# Patient Record
Sex: FEMALE | Race: WHITE | NOT HISPANIC OR LATINO | Employment: OTHER | URBAN - METROPOLITAN AREA
[De-identification: names, ages, dates, MRNs, and addresses within clinical notes are randomized per-mention and may not be internally consistent; named-entity substitution may affect disease eponyms.]

---

## 2024-06-03 ENCOUNTER — OFFICE VISIT (OUTPATIENT)
Dept: PRIMARY CARE | Facility: CLINIC | Age: 74
End: 2024-06-03
Payer: MEDICARE

## 2024-06-03 VITALS
SYSTOLIC BLOOD PRESSURE: 129 MMHG | HEART RATE: 75 BPM | DIASTOLIC BLOOD PRESSURE: 80 MMHG | HEIGHT: 57 IN | WEIGHT: 220 LBS | OXYGEN SATURATION: 95 % | BODY MASS INDEX: 47.46 KG/M2

## 2024-06-03 DIAGNOSIS — Z00.00 ROUTINE GENERAL MEDICAL EXAMINATION AT A HEALTH CARE FACILITY: ICD-10-CM

## 2024-06-03 DIAGNOSIS — J44.9 CHRONIC OBSTRUCTIVE PULMONARY DISEASE, UNSPECIFIED COPD TYPE (MULTI): Primary | ICD-10-CM

## 2024-06-03 DIAGNOSIS — Z12.11 ENCOUNTER FOR SCREENING COLONOSCOPY: ICD-10-CM

## 2024-06-03 DIAGNOSIS — E78.5 HYPERLIPIDEMIA, UNSPECIFIED HYPERLIPIDEMIA TYPE: ICD-10-CM

## 2024-06-03 DIAGNOSIS — F99 PSYCHIATRIC DISORDER: ICD-10-CM

## 2024-06-03 DIAGNOSIS — Z12.31 VISIT FOR SCREENING MAMMOGRAM: ICD-10-CM

## 2024-06-03 DIAGNOSIS — F32.A DEPRESSION, UNSPECIFIED DEPRESSION TYPE: ICD-10-CM

## 2024-06-03 PROCEDURE — 1036F TOBACCO NON-USER: CPT | Performed by: EMERGENCY MEDICINE

## 2024-06-03 PROCEDURE — 99203 OFFICE O/P NEW LOW 30 MIN: CPT | Performed by: EMERGENCY MEDICINE

## 2024-06-03 PROCEDURE — 1159F MED LIST DOCD IN RCRD: CPT | Performed by: EMERGENCY MEDICINE

## 2024-06-03 RX ORDER — ARIPIPRAZOLE 2 MG/1
2 TABLET ORAL DAILY
COMMUNITY
End: 2024-06-03 | Stop reason: SDUPTHER

## 2024-06-03 RX ORDER — ATORVASTATIN CALCIUM 10 MG/1
10 TABLET, FILM COATED ORAL DAILY
COMMUNITY
Start: 2023-12-14 | End: 2024-06-03 | Stop reason: SDUPTHER

## 2024-06-03 RX ORDER — MIRTAZAPINE 30 MG/1
30 TABLET, FILM COATED ORAL NIGHTLY
COMMUNITY
End: 2024-06-03 | Stop reason: SDUPTHER

## 2024-06-03 RX ORDER — IBUPROFEN 600 MG/1
600 TABLET ORAL DAILY
COMMUNITY
Start: 2024-03-15

## 2024-06-03 RX ORDER — ALBUTEROL SULFATE 90 UG/1
AEROSOL, METERED RESPIRATORY (INHALATION)
COMMUNITY
End: 2024-06-03 | Stop reason: SDUPTHER

## 2024-06-03 RX ORDER — METHYLPREDNISOLONE 4 MG/1
TABLET ORAL
COMMUNITY
Start: 2024-05-10

## 2024-06-03 NOTE — PROGRESS NOTES
"Subjective   Patient ID: Kay Franco is a 73 y.o. female who presents for Hospital Follow-up (Discharged 5/10/24).    Assessment/Plan   Problem List Items Addressed This Visit    None  Visit Diagnoses       Encounter for screening colonoscopy        Visit for screening mammogram            COPD-currently on 5 L of oxygen.  Wean as tolerated  She is done with antibiotics and steroids that she was discharged home with  She is on trilogy and albuterol    Impaired mobility-patient is currently getting physical therapy with home health.  Patient and family want to increase the duration and frequency.  We will send an order to home health    Psychiatric issues-patient is on Abilify and Remeron.  She is not able to state who her psychiatrist is or what her diagnosis  Are    Long-term health issues will be addressed at the next visit     source of history: Nurse, Medical personnel, Medical record, Patient.  History limitation: None.    HPI  73-year-old here to establish herself as a new patient    Was recently in the hospital for COPD exacerbation and was discharged home with home oxygen currently at 5 L    Wants medicines refilled and referred to pulmonary physician  No Known Allergies    Current Outpatient Medications   Medication Sig Dispense Refill    albuterol 90 mcg/actuation inhaler INHALE ONE PUFF BY MOUTH EVERY 6 HOURS AS NEEDED FOR WHEEZING      ARIPiprazole (Abilify) 2 mg tablet Take 1 tablet (2 mg) by mouth once daily.       mg tablet Take 1 tablet (600 mg) by mouth once daily.      Lipitor 10 mg tablet 1 tablet (10 mg) once daily.      methylPREDNISolone (Medrol Dospak) 4 mg tablets TAKE AS DIRECTED ON PACKAGE INSTRUCTIONS      mirtazapine (Remeron) 30 mg tablet Take 1 tablet (30 mg) by mouth once daily at bedtime.       No current facility-administered medications for this visit.       Objective   Visit Vitals  /80   Pulse 75   Ht 1.448 m (4' 9\")   Wt 99.8 kg (220 lb)   SpO2 95%   BMI 47.61 " kg/m²   Smoking Status Former   BSA 2 m²     Physical Exam  Vital signs as per nursing/MA documentation  General appearance: Alert and in no acute distress  HEENT: Normal Inspection  Neck - Normal Inspection  Respiratory : No respiratory distress. Lungs are clear   Cardiovascular: heart rate normal. No gallop  Back - normal inspection  Skin inspection:Warm  Musculoskeletal : No deformities  Neuro : Limited exam. Baseline    Review of Systems   Comprehensive review of systems as allowed by patient condition and nursing input is negative    Results including lab work, imaging reports were reviewed and wherever possible, independently verified    No family history on file.  Social History     Socioeconomic History    Marital status:      Spouse name: None    Number of children: None    Years of education: None    Highest education level: None   Occupational History    None   Tobacco Use    Smoking status: Former     Types: Cigarettes    Smokeless tobacco: Former   Vaping Use    Vaping status: Never Used   Substance and Sexual Activity    Alcohol use: Not Currently    Drug use: None    Sexual activity: None   Other Topics Concern    None   Social History Narrative    None     Social Determinants of Health     Financial Resource Strain: At Risk (10/19/2022)    Received from [facility]     Financial Resource Strain     Financial Resource Strain: 2   Food Insecurity: Not at Risk (10/19/2022)    Received from [facility]     Food Insecurity     Food: 1   Transportation Needs: Not on File (10/19/2022)    Received from [facility] [employer]     Transportation Needs     Transportation: 0   Physical Activity: Not on File (10/19/2022)    Received from [employer]     Physical Activity     Physical Activity: 0   Stress: Not at Risk (10/19/2022)    Received from [facility] [employer]     Stress     Stress: 1   Social Connections: Not at Risk (10/19/2022)    Received from [employer]     Social Connections     Social Connections and Isolation: 1   Intimate Partner Violence: Not  on file   Housing Stability: Not at Risk (10/19/2022)    [facility]     [employer] Stability     Housin     History reviewed. No pertinent past medical history.  Past Surgical History:   Procedure Laterality Date    ANKLE SURGERY  02/10/2014    Ankle Surgery    BREAST LUMPECTOMY  02/10/2014    Breast Surgery Lumpectomy    GALLBLADDER SURGERY  02/10/2014    Gallbladder Surgery    OTHER SURGICAL HISTORY  02/10/2014    Brain Surgery       Charting was completed using voice recognition technology and may include unintended errors.

## 2024-06-05 RX ORDER — ALBUTEROL SULFATE 90 UG/1
AEROSOL, METERED RESPIRATORY (INHALATION)
Qty: 18 G | Refills: 1 | Status: SHIPPED | OUTPATIENT
Start: 2024-06-05

## 2024-06-05 RX ORDER — ATORVASTATIN CALCIUM 10 MG/1
10 TABLET, FILM COATED ORAL DAILY
Qty: 90 TABLET | Refills: 1 | Status: SHIPPED | OUTPATIENT
Start: 2024-06-05

## 2024-06-05 RX ORDER — ARIPIPRAZOLE 2 MG/1
2 TABLET ORAL DAILY
Qty: 90 TABLET | Refills: 1 | Status: SHIPPED | OUTPATIENT
Start: 2024-06-05

## 2024-06-05 RX ORDER — MIRTAZAPINE 30 MG/1
30 TABLET, FILM COATED ORAL NIGHTLY
Qty: 90 TABLET | Refills: 1 | Status: SHIPPED | OUTPATIENT
Start: 2024-06-05

## 2024-08-02 ENCOUNTER — TELEPHONE (OUTPATIENT)
Dept: PRIMARY CARE | Facility: CLINIC | Age: 74
End: 2024-08-02
Payer: MEDICARE

## 2024-08-02 DIAGNOSIS — J44.9 CHRONIC OBSTRUCTIVE PULMONARY DISEASE, UNSPECIFIED COPD TYPE (MULTI): ICD-10-CM

## 2024-08-02 NOTE — TELEPHONE ENCOUNTER
1.PT WAS REFERRED TO PULMONARY    DR LOWE WAS OCTOBER  DR WEBER NUMBER GIVEN TO SEE IF SOONER    2. NEEDS NEBULIZER PER . MAY WANT TO DISCUSS WITH HIM  3. CAN'T GET NEW OXYGEN TANK TIL MONDAY    STRUGGLING FOR AIR. NEEDS HELP

## 2024-08-05 RX ORDER — IPRATROPIUM BROMIDE AND ALBUTEROL SULFATE 2.5; .5 MG/3ML; MG/3ML
3 SOLUTION RESPIRATORY (INHALATION) 4 TIMES DAILY PRN
Qty: 360 ML | Refills: 3 | Status: SHIPPED | OUTPATIENT
Start: 2024-08-05 | End: 2025-08-05

## 2024-08-15 DIAGNOSIS — E78.5 HYPERLIPIDEMIA, UNSPECIFIED HYPERLIPIDEMIA TYPE: ICD-10-CM

## 2024-08-15 DIAGNOSIS — M79.18 MUSCULOSKELETAL PAIN: Primary | ICD-10-CM

## 2024-08-15 DIAGNOSIS — F32.A DEPRESSION, UNSPECIFIED DEPRESSION TYPE: ICD-10-CM

## 2024-08-15 DIAGNOSIS — Z00.00 ROUTINE GENERAL MEDICAL EXAMINATION AT A HEALTH CARE FACILITY: ICD-10-CM

## 2024-08-15 RX ORDER — IBUPROFEN 600 MG/1
600 TABLET ORAL DAILY
Qty: 90 TABLET | Refills: 1 | Status: SHIPPED | OUTPATIENT
Start: 2024-08-15

## 2024-08-15 RX ORDER — ALBUTEROL SULFATE 90 UG/1
INHALANT RESPIRATORY (INHALATION)
Qty: 18 G | Refills: 1 | Status: SHIPPED | OUTPATIENT
Start: 2024-08-15

## 2024-08-15 RX ORDER — ATORVASTATIN CALCIUM 10 MG/1
10 TABLET, FILM COATED ORAL DAILY
Qty: 90 TABLET | Refills: 1 | Status: SHIPPED | OUTPATIENT
Start: 2024-08-15

## 2024-08-15 RX ORDER — MIRTAZAPINE 30 MG/1
30 TABLET, FILM COATED ORAL NIGHTLY
Qty: 90 TABLET | Refills: 1 | Status: SHIPPED | OUTPATIENT
Start: 2024-08-15

## 2024-08-15 RX ORDER — ARIPIPRAZOLE 2 MG/1
2 TABLET ORAL DAILY
Qty: 90 TABLET | Refills: 1 | Status: SHIPPED | OUTPATIENT
Start: 2024-08-15

## 2024-08-20 ENCOUNTER — TELEPHONE (OUTPATIENT)
Dept: PRIMARY CARE | Facility: CLINIC | Age: 74
End: 2024-08-20
Payer: MEDICARE

## 2024-08-20 NOTE — TELEPHONE ENCOUNTER
PT would like a refill of Trulicity.  I did not see this in the med list and she thinks another Dr prescribed a long time ago.

## 2024-09-13 ENCOUNTER — APPOINTMENT (OUTPATIENT)
Dept: PRIMARY CARE | Facility: CLINIC | Age: 74
End: 2024-09-13
Payer: MEDICARE

## 2024-09-13 VITALS
OXYGEN SATURATION: 93 % | SYSTOLIC BLOOD PRESSURE: 172 MMHG | HEIGHT: 61 IN | HEART RATE: 92 BPM | BODY MASS INDEX: 41.54 KG/M2 | WEIGHT: 220 LBS | DIASTOLIC BLOOD PRESSURE: 99 MMHG

## 2024-09-13 DIAGNOSIS — R73.02 IGT (IMPAIRED GLUCOSE TOLERANCE): ICD-10-CM

## 2024-09-13 DIAGNOSIS — E78.5 DYSLIPIDEMIA: ICD-10-CM

## 2024-09-13 DIAGNOSIS — J44.9 CHRONIC OBSTRUCTIVE PULMONARY DISEASE, UNSPECIFIED COPD TYPE (MULTI): ICD-10-CM

## 2024-09-13 DIAGNOSIS — E03.9 HYPOTHYROIDISM, UNSPECIFIED TYPE: ICD-10-CM

## 2024-09-13 DIAGNOSIS — Z00.00 ENCOUNTER FOR MEDICARE ANNUAL WELLNESS EXAM: Primary | ICD-10-CM

## 2024-09-13 DIAGNOSIS — D64.9 ANEMIA, UNSPECIFIED TYPE: ICD-10-CM

## 2024-09-13 DIAGNOSIS — E66.01 OBESITY, MORBID (MULTI): ICD-10-CM

## 2024-09-13 DIAGNOSIS — Z00.00 ROUTINE GENERAL MEDICAL EXAMINATION AT A HEALTH CARE FACILITY: ICD-10-CM

## 2024-09-13 DIAGNOSIS — F99 PSYCHIATRIC DISORDER: ICD-10-CM

## 2024-09-13 DIAGNOSIS — J96.91 RESPIRATORY FAILURE WITH HYPOXIA, UNSPECIFIED CHRONICITY (MULTI): ICD-10-CM

## 2024-09-13 DIAGNOSIS — Z23 FLU VACCINE NEED: ICD-10-CM

## 2024-09-13 DIAGNOSIS — I10 PRIMARY HYPERTENSION: ICD-10-CM

## 2024-09-13 PROCEDURE — G0446 INTENS BEHAVE THER CARDIO DX: HCPCS | Performed by: EMERGENCY MEDICINE

## 2024-09-13 PROCEDURE — 1123F ACP DISCUSS/DSCN MKR DOCD: CPT | Performed by: EMERGENCY MEDICINE

## 2024-09-13 PROCEDURE — 1170F FXNL STATUS ASSESSED: CPT | Performed by: EMERGENCY MEDICINE

## 2024-09-13 PROCEDURE — 3077F SYST BP >= 140 MM HG: CPT | Performed by: EMERGENCY MEDICINE

## 2024-09-13 PROCEDURE — 99497 ADVNCD CARE PLAN 30 MIN: CPT | Performed by: EMERGENCY MEDICINE

## 2024-09-13 PROCEDURE — 3080F DIAST BP >= 90 MM HG: CPT | Performed by: EMERGENCY MEDICINE

## 2024-09-13 PROCEDURE — G0008 ADMIN INFLUENZA VIRUS VAC: HCPCS | Performed by: EMERGENCY MEDICINE

## 2024-09-13 PROCEDURE — 1159F MED LIST DOCD IN RCRD: CPT | Performed by: EMERGENCY MEDICINE

## 2024-09-13 PROCEDURE — 90662 IIV NO PRSV INCREASED AG IM: CPT | Performed by: EMERGENCY MEDICINE

## 2024-09-13 PROCEDURE — G0442 ANNUAL ALCOHOL SCREEN 15 MIN: HCPCS | Performed by: EMERGENCY MEDICINE

## 2024-09-13 PROCEDURE — 99213 OFFICE O/P EST LOW 20 MIN: CPT | Performed by: EMERGENCY MEDICINE

## 2024-09-13 PROCEDURE — G0439 PPPS, SUBSEQ VISIT: HCPCS | Performed by: EMERGENCY MEDICINE

## 2024-09-13 PROCEDURE — 3008F BODY MASS INDEX DOCD: CPT | Performed by: EMERGENCY MEDICINE

## 2024-09-13 PROCEDURE — G0009 ADMIN PNEUMOCOCCAL VACCINE: HCPCS | Performed by: EMERGENCY MEDICINE

## 2024-09-13 PROCEDURE — 1036F TOBACCO NON-USER: CPT | Performed by: EMERGENCY MEDICINE

## 2024-09-13 PROCEDURE — G0444 DEPRESSION SCREEN ANNUAL: HCPCS | Performed by: EMERGENCY MEDICINE

## 2024-09-13 PROCEDURE — 90677 PCV20 VACCINE IM: CPT | Performed by: EMERGENCY MEDICINE

## 2024-09-13 PROCEDURE — 99397 PER PM REEVAL EST PAT 65+ YR: CPT | Performed by: EMERGENCY MEDICINE

## 2024-09-13 ASSESSMENT — ACTIVITIES OF DAILY LIVING (ADL)
GROCERY_SHOPPING: NEEDS ASSISTANCE
MANAGING_FINANCES: NEEDS ASSISTANCE
TAKING_MEDICATION: NEEDS ASSISTANCE
BATHING: NEEDS ASSISTANCE
DOING_HOUSEWORK: NEEDS ASSISTANCE
DRESSING: NEEDS ASSISTANCE

## 2024-09-13 ASSESSMENT — PATIENT HEALTH QUESTIONNAIRE - PHQ9
2. FEELING DOWN, DEPRESSED OR HOPELESS: NOT AT ALL
1. LITTLE INTEREST OR PLEASURE IN DOING THINGS: NOT AT ALL
1. LITTLE INTEREST OR PLEASURE IN DOING THINGS: NOT AT ALL
2. FEELING DOWN, DEPRESSED OR HOPELESS: NOT AT ALL
SUM OF ALL RESPONSES TO PHQ9 QUESTIONS 1 AND 2: 0
SUM OF ALL RESPONSES TO PHQ9 QUESTIONS 1 AND 2: 0

## 2024-09-13 NOTE — PROGRESS NOTES
Subjective   Patient ID: Kay Franco is a 73 y.o. female who presents for Medicare Annual Wellness Visit Subsequent.    Assessment/Plan   Problem List Items Addressed This Visit    None  Visit Diagnoses       Flu vaccine need        Relevant Orders    Flu vaccine, trivalent, preservative free, HIGH-DOSE, age 65y+ (Fluzone) (Completed)        Patient presents for medicare wellness visit    COPD-currently on 5 L of oxygen.  Wean down as long as pulse ox stays above 88%. She is on trilogy and albuterol.    Hypertension - BP is elevated in the office today. She will keep a home log and record the readings until next visit, if persistently elevated we will consider medication.    Impaired mobility-patient is currently getting physical therapy with home health.  Patient and family want to increase the duration and frequency.  We will send an order to home health    Psychiatric issues-patient is on Abilify and Remeron.  She is not able to state who her psychiatrist is or what her diagnosis    Preventative care -  Patients agrees to prevnar 20 in office today  Patient had a colonoscopy 3 years ago  Patient is over the age for mammogram  She does not qualify for any other preventative care    Labs ordered    PH Q-9 depression screening was completed by authorized employee of the practice for 5-10 minutes and  explained the questionnaire and discussed the answers with the patient.     Alcohol screening was completed for 5 to 10 minutes     We had face-to-face discussion up to 15 minutes with this patient about the cardiovascular risk and behavioral therapies of nutritional choices, exercise and elimination of habits contradicting to the risk. We agreed on how they may be able to reduce their current cardiovascular risk.     I discussed advanced care planning for more than 16 minutes including the explanation and discussion of advanced directives. Information and advise was also provided on DO NOT RESUSCITATE and patient  "encouraged to consider this  Patient wishes to be full code at this time.      Follow up in 3 months or as necessary    source of history: Nurse, Medical personnel, Medical record, Patient.  History limitation: None.    HPI  73-year-old here for medicare wellness visit    Wants medicines refilled and referred to pulmonary physician  No Known Allergies    Current Outpatient Medications   Medication Sig Dispense Refill    albuterol 90 mcg/actuation inhaler INHALE ONE PUFF BY MOUTH EVERY 6 HOURS AS NEEDED FOR WHEEZING 18 g 1    ARIPiprazole (Abilify) 2 mg tablet Take 1 tablet (2 mg) by mouth once daily. 90 tablet 1    atorvastatin (Lipitor) 10 mg tablet Take 1 tablet (10 mg) by mouth once daily. 90 tablet 1    ibuprofen (IBU) 600 mg tablet Take 1 tablet (600 mg) by mouth once daily. 90 tablet 1    ipratropium-albuteroL (Duo-Neb) 0.5-2.5 mg/3 mL nebulizer solution Take 3 mL by nebulization 4 times a day as needed for wheezing or shortness of breath. 360 mL 3    methylPREDNISolone (Medrol Dospak) 4 mg tablets TAKE AS DIRECTED ON PACKAGE INSTRUCTIONS      mirtazapine (Remeron) 30 mg tablet Take 1 tablet (30 mg) by mouth once daily at bedtime. 90 tablet 1     No current facility-administered medications for this visit.       Objective   Visit Vitals  BP (!) 172/99   Pulse 92   Ht 1.549 m (5' 1\")   Wt 99.8 kg (220 lb)   SpO2 93%   BMI 41.57 kg/m²   Smoking Status Former   BSA 2.07 m²     Physical Exam  Vital signs as per nursing/MA documentation  General appearance: Alert and in no acute distress  HEENT: Normal Inspection  Neck - Normal Inspection  Respiratory : No respiratory distress. Lungs are clear   Cardiovascular: heart rate normal. No gallop  Back - normal inspection  Skin inspection:Warm  Musculoskeletal : No deformities  Neuro : Limited exam. Baseline    Review of Systems   Comprehensive review of systems as allowed by patient condition and nursing input is negative    Results including lab work, imaging reports " were reviewed and wherever possible, independently verified    No family history on file.  Social History     Socioeconomic History    Marital status:    Tobacco Use    Smoking status: Former     Types: Cigarettes    Smokeless tobacco: Former   Vaping Use    Vaping status: Never Used   Substance and Sexual Activity    Alcohol use: Not Currently     Social Determinants of Health     Financial Resource Strain: Not on File (10/19/2022)    Received from [employer] [facility]    Financial Resource Strain     Financial Resource Strain: 0   Recent Concern: Financial Resource Strain - At Risk (10/19/2022)    Received from [employer]    Financial Resource Strain     Financial Resource Strain: 2   Transportation Needs: Not on File (10/19/2022)    Received from [facility]    Transportation Needs     Transportation: 0   Physical Activity: Not on File (10/19/2022)    Received from [facility]    Physical Activity     Physical Activity: 0   Stress: Not on File (10/19/2022)    Received from [facility]    Stress     Stress: 0   Social Connections: Not on File (10/19/2022)    Received from [employer] [facility]    Social Connections     Connectedness: 0   Housing Stability: Not on File (10/19/2022)    Received from [employer]    Housing Stability     Housin     History reviewed. No pertinent past medical history.  Past Surgical History:   Procedure Laterality Date    ANKLE SURGERY  02/10/2014    Ankle Surgery    BREAST LUMPECTOMY  02/10/2014    Breast Surgery Lumpectomy    GALLBLADDER SURGERY  02/10/2014    Gallbladder Surgery    OTHER SURGICAL HISTORY  02/10/2014    Brain Surgery       Charting was completed using voice recognition technology and may include unintended errors.

## 2025-03-21 ENCOUNTER — APPOINTMENT (OUTPATIENT)
Dept: PRIMARY CARE | Facility: CLINIC | Age: 75
End: 2025-03-21
Payer: MEDICARE

## 2025-03-21 VITALS
DIASTOLIC BLOOD PRESSURE: 74 MMHG | HEIGHT: 61 IN | WEIGHT: 220 LBS | HEART RATE: 67 BPM | SYSTOLIC BLOOD PRESSURE: 109 MMHG | BODY MASS INDEX: 41.54 KG/M2 | OXYGEN SATURATION: 93 %

## 2025-03-21 DIAGNOSIS — R73.02 IGT (IMPAIRED GLUCOSE TOLERANCE): ICD-10-CM

## 2025-03-21 DIAGNOSIS — E66.01 OBESITY, MORBID (MULTI): ICD-10-CM

## 2025-03-21 DIAGNOSIS — D64.9 ANEMIA, UNSPECIFIED TYPE: ICD-10-CM

## 2025-03-21 DIAGNOSIS — J96.91 RESPIRATORY FAILURE WITH HYPOXIA, UNSPECIFIED CHRONICITY (MULTI): ICD-10-CM

## 2025-03-21 DIAGNOSIS — E03.9 HYPOTHYROIDISM, UNSPECIFIED TYPE: ICD-10-CM

## 2025-03-21 DIAGNOSIS — Z00.00 WELLNESS EXAMINATION: Primary | ICD-10-CM

## 2025-03-21 DIAGNOSIS — E78.5 DYSLIPIDEMIA: ICD-10-CM

## 2025-03-21 DIAGNOSIS — Z00.00 ROUTINE GENERAL MEDICAL EXAMINATION AT A HEALTH CARE FACILITY: ICD-10-CM

## 2025-03-21 DIAGNOSIS — J44.1 ACUTE EXACERBATION OF CHRONIC OBSTRUCTIVE PULMONARY DISEASE (MULTI): ICD-10-CM

## 2025-03-21 DIAGNOSIS — K21.9 GASTROESOPHAGEAL REFLUX DISEASE, UNSPECIFIED WHETHER ESOPHAGITIS PRESENT: ICD-10-CM

## 2025-03-21 RX ORDER — FLUTICASONE FUROATE, UMECLIDINIUM BROMIDE AND VILANTEROL TRIFENATATE 100; 62.5; 25 UG/1; UG/1; UG/1
POWDER RESPIRATORY (INHALATION)
COMMUNITY
Start: 2025-01-30

## 2025-03-21 RX ORDER — ALBUTEROL SULFATE 90 UG/1
INHALANT RESPIRATORY (INHALATION)
Qty: 18 G | Refills: 1 | Status: SHIPPED | OUTPATIENT
Start: 2025-03-21

## 2025-03-21 RX ORDER — PANTOPRAZOLE SODIUM 40 MG/1
40 TABLET, DELAYED RELEASE ORAL DAILY
Qty: 90 TABLET | Refills: 1 | Status: SHIPPED | OUTPATIENT
Start: 2025-03-21

## 2025-03-21 RX ORDER — PANTOPRAZOLE SODIUM 40 MG/1
40 TABLET, DELAYED RELEASE ORAL DAILY
COMMUNITY
End: 2025-03-21 | Stop reason: SDUPTHER

## 2025-03-21 ASSESSMENT — ACTIVITIES OF DAILY LIVING (ADL)
GROCERY_SHOPPING: NEEDS ASSISTANCE
MANAGING_FINANCES: NEEDS ASSISTANCE
DOING_HOUSEWORK: NEEDS ASSISTANCE
TAKING_MEDICATION: NEEDS ASSISTANCE

## 2025-03-21 ASSESSMENT — PATIENT HEALTH QUESTIONNAIRE - PHQ9
2. FEELING DOWN, DEPRESSED OR HOPELESS: NOT AT ALL
SUM OF ALL RESPONSES TO PHQ9 QUESTIONS 1 AND 2: 0
1. LITTLE INTEREST OR PLEASURE IN DOING THINGS: NOT AT ALL
1. LITTLE INTEREST OR PLEASURE IN DOING THINGS: NOT AT ALL

## 2025-03-21 NOTE — PROGRESS NOTES
Subjective   Patient ID: Kay Franco is a 74 y.o. female who presents for Medicare Annual Wellness Visit Subsequent.    Assessment/Plan   Problem List Items Addressed This Visit    None  Visit Diagnoses       Anemia, unspecified type    -  Primary    Relevant Orders    CBC and Auto Differential    Routine general medical examination at a health care facility        Relevant Orders    Comprehensive Metabolic Panel    IGT (impaired glucose tolerance)        Relevant Orders    Hemoglobin A1C    Dyslipidemia        Relevant Orders    Lipid Panel    Hypothyroidism, unspecified type        Relevant Orders    TSH with reflex to Free T4 if abnormal          Patient presents for medicare wellness, physical and office visit    COPD-currently on 5 L of oxygen.  Wean down as long as pulse ox stays above 88%. She is on trilogy and albuterol.    Hypertension -currently controlled    Impaired mobility-patient is currently getting physical therapy with home health.  Patient and family want to increase the duration and frequency.  We will send an order to home health    Psychiatric issues-patient is on Abilify and Remeron.  She is not able to state who her psychiatrist is or what her diagnosis    Preventative care -  Patients agrees to prevnar 20 in office today  Patient had a colonoscopy 3 years ago  Patient is over the age for mammogram  She does not qualify for any other preventative care    Labs ordered    PH Q-9 depression screening was completed by authorized employee of the practice for 5-10 minutes and  explained the questionnaire and discussed the answers with the patient.     Alcohol screening was completed for 5 to 10 minutes     I discussed advanced care planning for more than 16 minutes including the explanation and discussion of advanced directives. Information and advise was also provided on DO NOT RESUSCITATE and patient encouraged to consider this  Patient wishes to be full code at this time.      Follow up in 3  "months or as necessary    source of history: Nurse, Medical personnel, Medical record, Patient.  History limitation: None.    HPI  73-year-old here for medicare wellness, physical and office visit    Wants medicines refilled and referred to pulmonary physician  No Known Allergies    Current Outpatient Medications   Medication Sig Dispense Refill    ARIPiprazole (Abilify) 2 mg tablet Take 1 tablet (2 mg) by mouth once daily. 90 tablet 1    atorvastatin (Lipitor) 10 mg tablet Take 1 tablet (10 mg) by mouth once daily. 90 tablet 1    ibuprofen (IBU) 600 mg tablet Take 1 tablet (600 mg) by mouth once daily. 90 tablet 1    ipratropium-albuteroL (Duo-Neb) 0.5-2.5 mg/3 mL nebulizer solution Take 3 mL by nebulization 4 times a day as needed for wheezing or shortness of breath. 360 mL 3    mirtazapine (Remeron) 30 mg tablet Take 1 tablet (30 mg) by mouth once daily at bedtime. 90 tablet 1    pantoprazole (ProtoNix) 40 mg EC tablet Take 1 tablet (40 mg) by mouth once daily.      Trelegy Ellipta 100-62.5-25 mcg blister with device INHALE ONE PUFF BY MOUTH DAILY at the same time every day.      albuterol 90 mcg/actuation inhaler INHALE ONE PUFF BY MOUTH EVERY 6 HOURS AS NEEDED FOR WHEEZING 18 g 1    methylPREDNISolone (Medrol Dospak) 4 mg tablets TAKE AS DIRECTED ON PACKAGE INSTRUCTIONS (Patient not taking: Reported on 3/21/2025)       No current facility-administered medications for this visit.       Objective   Visit Vitals  /74   Pulse 67   Ht 1.549 m (5' 1\")   Wt 99.8 kg (220 lb)   SpO2 93%   BMI 41.57 kg/m²   Smoking Status Former   BSA 2.07 m²     Physical Exam  Vital signs as per nursing/MA documentation  General appearance: Alert and in no acute distress  HEENT: Normal Inspection  Neck - Normal Inspection  Respiratory : No respiratory distress. Lungs are clear   Cardiovascular: heart rate normal. No gallop  Back - normal inspection  Skin inspection:Warm  Musculoskeletal : No deformities  Neuro : Limited exam. " Baseline    Review of Systems   Comprehensive review of systems as allowed by patient condition and nursing input is negative    Results including lab work, imaging reports were reviewed and wherever possible, independently verified    No family history on file.  Social History     Socioeconomic History    Marital status:    Tobacco Use    Smoking status: Former     Types: Cigarettes    Smokeless tobacco: Former   Vaping Use    Vaping status: Never Used   Substance and Sexual Activity    Alcohol use: Not Currently     Social Drivers of Health     Financial Resource Strain: Not on File (10/19/2022)    Received from "[employer]    Financial Resource Strain     Financial Resource Strain: 0   Recent Concern: Financial Resource Strain - At Risk (10/19/2022)    Received from "[employer]    Financial Resource Strain     Financial Resource Strain: 2   Food Insecurity: Not on File (10/19/2022)    Received from [facility]    Food Insecurity     Food: 0   Transportation Needs: Not on File (10/19/2022)    Received from [facility]    Transportation Needs     Transportation: 0   Physical Activity: Not on File (10/19/2022)    Received from [facility]    Physical Activity     Physical Activity: 0   Stress: Not on File (10/19/2022)    Received from "[employer]    Stress     Stress: 0   Social Connections: Not on File (10/19/2022)    Received from "[employer]    Social Connections     Connectedness: 0   Housing Stability: Not on File (10/19/2022)    Received from "[employer]    Housing Stability     Housin     History reviewed. No pertinent past medical history.  Past Surgical History:   Procedure Laterality Date    ANKLE SURGERY  02/10/2014    Ankle Surgery    BREAST LUMPECTOMY  02/10/2014    Breast Surgery Lumpectomy    GALLBLADDER SURGERY  02/10/2014    Gallbladder Surgery    OTHER SURGICAL HISTORY  02/10/2014    Brain Surgery       Charting was completed using voice recognition technology and may include unintended errors.

## 2025-03-23 LAB
ALBUMIN SERPL-MCNC: 4 G/DL (ref 3.6–5.1)
ALP SERPL-CCNC: 110 U/L (ref 37–153)
ALT SERPL-CCNC: 15 U/L (ref 6–29)
ANION GAP SERPL CALCULATED.4IONS-SCNC: 9 MMOL/L (CALC) (ref 7–17)
AST SERPL-CCNC: 18 U/L (ref 10–35)
BASOPHILS # BLD AUTO: 49 CELLS/UL (ref 0–200)
BASOPHILS NFR BLD AUTO: 0.4 %
BILIRUB SERPL-MCNC: 0.4 MG/DL (ref 0.2–1.2)
BUN SERPL-MCNC: 10 MG/DL (ref 7–25)
CALCIUM SERPL-MCNC: 9.7 MG/DL (ref 8.6–10.4)
CHLORIDE SERPL-SCNC: 100 MMOL/L (ref 98–110)
CHOLEST SERPL-MCNC: 150 MG/DL
CHOLEST/HDLC SERPL: 2.7 (CALC)
CO2 SERPL-SCNC: 25 MMOL/L (ref 20–32)
CREAT SERPL-MCNC: 0.8 MG/DL (ref 0.6–1)
EGFRCR SERPLBLD CKD-EPI 2021: 77 ML/MIN/1.73M2
EOSINOPHIL # BLD AUTO: 111 CELLS/UL (ref 15–500)
EOSINOPHIL NFR BLD AUTO: 0.9 %
ERYTHROCYTE [DISTWIDTH] IN BLOOD BY AUTOMATED COUNT: 12.7 % (ref 11–15)
EST. AVERAGE GLUCOSE BLD GHB EST-MCNC: 123 MG/DL
EST. AVERAGE GLUCOSE BLD GHB EST-SCNC: 6.8 MMOL/L
GLUCOSE SERPL-MCNC: 105 MG/DL (ref 65–99)
HBA1C MFR BLD: 5.9 % OF TOTAL HGB
HCT VFR BLD AUTO: 45.8 % (ref 35–45)
HDLC SERPL-MCNC: 56 MG/DL
HGB BLD-MCNC: 15.3 G/DL (ref 11.7–15.5)
LDLC SERPL CALC-MCNC: 75 MG/DL (CALC)
LYMPHOCYTES # BLD AUTO: 2386 CELLS/UL (ref 850–3900)
LYMPHOCYTES NFR BLD AUTO: 19.4 %
MCH RBC QN AUTO: 31.7 PG (ref 27–33)
MCHC RBC AUTO-ENTMCNC: 33.4 G/DL (ref 32–36)
MCV RBC AUTO: 95 FL (ref 80–100)
MONOCYTES # BLD AUTO: 775 CELLS/UL (ref 200–950)
MONOCYTES NFR BLD AUTO: 6.3 %
NEUTROPHILS # BLD AUTO: 8979 CELLS/UL (ref 1500–7800)
NEUTROPHILS NFR BLD AUTO: 73 %
NONHDLC SERPL-MCNC: 94 MG/DL (CALC)
PLATELET # BLD AUTO: 226 THOUSAND/UL (ref 140–400)
PMV BLD REES-ECKER: 10.2 FL (ref 7.5–12.5)
POTASSIUM SERPL-SCNC: 4.5 MMOL/L (ref 3.5–5.3)
PROT SERPL-MCNC: 7.2 G/DL (ref 6.1–8.1)
RBC # BLD AUTO: 4.82 MILLION/UL (ref 3.8–5.1)
SODIUM SERPL-SCNC: 134 MMOL/L (ref 135–146)
TRIGL SERPL-MCNC: 102 MG/DL
TSH SERPL-ACNC: 1.99 MIU/L (ref 0.4–4.5)
WBC # BLD AUTO: 12.3 THOUSAND/UL (ref 3.8–10.8)

## 2025-05-28 ENCOUNTER — TELEPHONE (OUTPATIENT)
Dept: PRIMARY CARE | Facility: CLINIC | Age: 75
End: 2025-05-28
Payer: MEDICARE

## 2025-05-28 DIAGNOSIS — F99 PSYCHIATRIC DISORDER: ICD-10-CM

## 2025-05-28 NOTE — TELEPHONE ENCOUNTER
PATIENT WAS ON PAXIL THAT WAS PRESCRIBED BY THE [facility]  SHE IS OUT OF MED AND IS ASKING FOR A REFILL    PAXIL 10 MG 1 DAILY    SHE DOES NOT SEE PYSCHIATRY    PLEASE ADVISE  [facility]

## 2025-05-29 RX ORDER — PAROXETINE 10 MG/1
10 TABLET, FILM COATED ORAL EVERY MORNING
Qty: 30 TABLET | Refills: 2 | Status: SHIPPED | OUTPATIENT
Start: 2025-05-29

## 2025-08-22 ENCOUNTER — TELEPHONE (OUTPATIENT)
Dept: PRIMARY CARE | Facility: CLINIC | Age: 75
End: 2025-08-22
Payer: MEDICARE

## 2025-08-27 ENCOUNTER — APPOINTMENT (OUTPATIENT)
Dept: PRIMARY CARE | Facility: CLINIC | Age: 75
End: 2025-08-27
Payer: MEDICARE